# Patient Record
Sex: FEMALE | Race: WHITE
[De-identification: names, ages, dates, MRNs, and addresses within clinical notes are randomized per-mention and may not be internally consistent; named-entity substitution may affect disease eponyms.]

---

## 2018-01-08 ENCOUNTER — HOSPITAL ENCOUNTER (EMERGENCY)
Dept: HOSPITAL 80 - FED | Age: 28
Discharge: HOME | End: 2018-01-08
Payer: MEDICAID

## 2018-01-08 VITALS — RESPIRATION RATE: 16 BRPM | TEMPERATURE: 98.8 F

## 2018-01-08 VITALS — OXYGEN SATURATION: 98 % | SYSTOLIC BLOOD PRESSURE: 124 MMHG | HEART RATE: 80 BPM | DIASTOLIC BLOOD PRESSURE: 76 MMHG
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
## 2018-01-08 DIAGNOSIS — E11.9: ICD-10-CM

## 2018-01-08 DIAGNOSIS — Z79.4: ICD-10-CM

## 2018-01-08 DIAGNOSIS — S93.402A: Primary | ICD-10-CM
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
## 2018-01-08 DIAGNOSIS — Z87.891: ICD-10-CM

## 2018-01-08 PROCEDURE — L4386 NON-PNEUM WALK BOOT PRE CST: HCPCS

## 2018-01-08 NOTE — EDPHY
H & P


HPI/ROS: 





HPI





CHIEF COMPLAINT:  Left ankle pain.





HISTORY OF PRESENT ILLNESS:  This patient very pleasant 27-year-old female, she 

is otherwise healthy but does have a history of diabetes, she presents 

emergency room with left ankle pain.  She reports that she was in the kitchen 

last night rolled her left ankle.  She now has tenderness to the left lateral 

malleolus.  And some mild soft tissue swelling inferior to left lateral 

malleolus.  She has good distal pulse.  Good cap refill.  Warm extremity.  She 

denies any knee pain or upper thigh pain. She states that hurts when she bears 

weight on it.





Past Medical History:  Denies significant medical history except for diabetes





Past Surgical History:  No recent surgery





Social History:  Lives locally, denies daily use of drugs alcohol tobacco.





Family History:  Noncontributory








ROS   


REVIEW OF SYSTEMS:


A comprehensive 10 point review of systems is otherwise negative aside from 

elements mentioned in the history of present illness.








Exam   


Constitutional  appears well nontoxic triage nursing summary reviewed, vital 

signs reviewed, awake/alert. 


Eyes   normal conjunctivae and sclera, EOMI, PERRLA. 


HENT   normal inspection, atraumatic, moist mucus membranes, no epistaxis, neck 

supple/ no meningismus, no raccoon eyes. 


Respiratory   clear to auscultation bilaterally, normal breath sounds, no 

respiratory distress, no wheezing. 


Cardiovascular   rate normal, regular rhythm, no murmur, no edema, distal 

pulses normal. 


Gastrointestinal   soft, non-tender, no rebound, no guarding, normal bowel 

sounds, no distension, no pulsatile mass. 


Genitourinary   no CVA tenderness. 


Musculoskeletal  no midline vertebral tenderness, full range of motion, no calf 

swelling, no tenderness of extremities, no meningismus, good pulses, 

neurovascularly intact. Left lower extremity:  This is neurovascularly intact.  

Good distal pulse.  Good cap refill.  Tender palpation over the left lateral 

malleolus some swelling noted inferior left lateral malleolus.  Compartments 

are soft.  No calf tenderness or calf swelling. Good distal pulse.  Good cap 

refill.  Sensation intact.


Skin   pink, warm, & dry, no rash, skin atraumatic. 


Neurologic   awake, alert and oriented x 3, AAOx3, moves all 4 extremities 

equally, motor intact, sensory intact, CN II-XII intact, normal cerebellar, 

normal vision, normal speech. 


Psychiatric   normal mood/affect. 


Heme/Lymph/Immune   no lymphadenopathy.





Differential Diagnosis:  Includes but is not limited to in a particular order 

acute left ankle sprain, tendinitis, ankle fracture, avulsion fracture, soft 

tissue injury, nerve injury, plantar fasciitis.





Medical Decision Making:  Plan for this patient ibuprofen 800 mg for pain 

control, x-ray left ankle.





Re-evaluation:








X-ray of the left ankle reviewed by myself:  No evidence of acute fracture 

malalignment.





Patient placed in a walking boot for comfort, crutches as needed for support.  

Ibuprofen for pain control.





Recommend follow up with Podiatry or her primary care doctor.











0551:  X-ray of the left ankle is been reviewed.  Interpreted by myself.  I do 

not appreciate any significant malalignment or evidence of fracture.





Recommend crutches for weight-bearing, recommend walking boot for comfort.  

Additionally recommend ice and ibuprofen anti-inflammatory pain medicine.  If 

she continues to have pain I do recommend she follows up with Podiatry or her 

primary care doctor she understands.


Source: Patient





- Medical/Surgical History


Hx Asthma: No


Hx Chronic Respiratory Disease: No


Hx Diabetes: Yes


Hx Cardiac Disease: No


Hx Renal Disease: No


Hx Cirrhosis: No


Hx Alcoholism: No


Hx HIV/AIDS: No


Hx Splenectomy or Spleen Trauma: No


Other PMH: IDDM





- Social History


Smoking Status: Former smoker


Constitutional: 


 Initial Vital Signs











Temperature (C)  37.1 C   01/08/18 05:21


 


Heart Rate  89   01/08/18 05:21


 


Respiratory Rate  16   01/08/18 05:21


 


Blood Pressure  133/83 H  01/08/18 05:21


 


O2 Sat (%)  97   01/08/18 05:21








 











O2 Delivery Mode               Room Air














Allergies/Adverse Reactions: 


 





bacitracin Allergy (Verified 01/08/18 05:30)


 








Home Medications: 














 Medication  Instructions  Recorded


 


Insulin Pump Syringe, 3 ml  03/04/16


 


MULTI UGKL-ILSH-EW 0.25 MG  03/04/16


 


RX: Ibuprofen [Motrin (*)] 800 mg PO Q6-8PRN #10 tab 01/08/18


 


Xanax  01/08/18














Medical Decision Making





- Data Points


Medications Given: 


 








Discontinued Medications





Ibuprofen (Motrin)  800 mg PO EDNOW ONE


   Stop: 01/08/18 05:39


   Last Admin: 01/08/18 05:43 Dose:  800 mg








Departure





- Departure


Disposition: Home, Routine, Self-Care


Clinical Impression: 


Ankle sprain


Qualifiers:


 Encounter type: initial encounter Involved ligament of ankle: unspecified 

ligament Laterality: left Qualified Code(s): S93.402A - Sprain of unspecified 

ligament of left ankle, initial encounter





Condition: Good


Instructions:  Ankle Sprain (ED)


Additional Instructions: 


1.  I recommend that you ice her ankle.


2.  Take anti-inflammatory pain medicine like Tylenol Motrin for pain control.


3.  Follow up with her primary care doctor or podiatrist.


4.  Crutches to help support her weight.


5.  Walking boot for comfort.


Referrals: 


BERTA ARMENDARIZ [Primary Care Provider] - As per Instructions


Tosha Armijo DPM [Doctor of Podiatric Medicine] - As per Instructions


Prescriptions: 


RX: Ibuprofen [Motrin (*)] 800 mg PO Q6-8PRN #10 tab

## 2018-02-21 ENCOUNTER — HOSPITAL ENCOUNTER (OUTPATIENT)
Dept: HOSPITAL 80 - FIMAGING | Age: 28
End: 2018-02-21
Attending: FAMILY MEDICINE
Payer: MEDICAID

## 2018-02-21 DIAGNOSIS — E23.7: Primary | ICD-10-CM

## 2018-02-21 PROCEDURE — A9585 GADOBUTROL INJECTION: HCPCS

## 2018-04-06 ENCOUNTER — HOSPITAL ENCOUNTER (OUTPATIENT)
Dept: HOSPITAL 80 - FIMAGING | Age: 28
End: 2018-04-06
Attending: FAMILY MEDICINE
Payer: MEDICAID

## 2018-04-06 DIAGNOSIS — N63.20: Primary | ICD-10-CM

## 2018-04-06 DIAGNOSIS — R92.8: ICD-10-CM

## 2018-04-10 ENCOUNTER — HOSPITAL ENCOUNTER (OUTPATIENT)
Dept: HOSPITAL 80 - FIMAGING | Age: 28
End: 2018-04-10
Attending: INTERNAL MEDICINE
Payer: MEDICAID

## 2018-04-10 DIAGNOSIS — Z86.39: ICD-10-CM

## 2018-04-10 DIAGNOSIS — R94.6: Primary | ICD-10-CM

## 2018-04-10 PROCEDURE — 78012 THYROID UPTAKE MEASUREMENT: CPT

## 2018-04-10 PROCEDURE — A9516 IODINE I-123 SOD IODIDE MIC: HCPCS

## 2018-06-13 ENCOUNTER — HOSPITAL ENCOUNTER (EMERGENCY)
Dept: HOSPITAL 80 - FED | Age: 28
Discharge: HOME | End: 2018-06-13
Payer: MEDICAID

## 2018-06-13 VITALS — SYSTOLIC BLOOD PRESSURE: 130 MMHG | DIASTOLIC BLOOD PRESSURE: 87 MMHG

## 2018-06-13 DIAGNOSIS — Z79.4: ICD-10-CM

## 2018-06-13 DIAGNOSIS — Z87.891: ICD-10-CM

## 2018-06-13 DIAGNOSIS — E11.9: ICD-10-CM

## 2018-06-13 DIAGNOSIS — R00.2: Primary | ICD-10-CM

## 2018-06-13 DIAGNOSIS — F41.9: ICD-10-CM

## 2018-06-13 LAB — PLATELET # BLD: 404 10^3/UL (ref 150–400)

## 2018-06-13 NOTE — EDPHY
H & P


Stated Complaint: Elevated HR and dizziness on and off for several weeks.


Time Seen by Provider: 06/13/18 13:48


HPI/ROS: 





CHIEF COMPLAINT:  Palpitations





HISTORY OF PRESENT ILLNESS:  Patient is a 27-year-old female with a history of 

diabetes with an insulin pump also anxiety and pituitary cyst and thyroiditis.  

She reports intermittent tachycardia up to 95. She states that this is happened 

for several years but seems to happen more frequently in the last few weeks.  

She states that her typical heart rate is in the 70s.  She states that it 

occasionally happens with exertion but often times even while resting or lying 

still.  She has had echoes in the past which have been normal.  She has not 

worn a Holter monitor.  She denies chest pain.  She denies shortness of breath.

  She denies nausea vomiting or diaphoresis.  No lightheadedness.








REVIEW OF SYSTEMS:


Constitutional:  denies: chills, fever, recent illness, recent injury


EENTM: denies: blurred vision, double vision, nose congestion


Respiratory: denies: cough, shortness of breath


Cardiac:  See HPI denies: chest pain, irregular heart rate, lightheadedness, 


Gastrointestinal/Abdominal: denies: abdominal pain, diarrhea, nausea, vomiting, 

blood streaked stools


Genitourinary: denies: dysuria, frequency, hematuria, pain


Musculoskeletal: denies: joint pain, muscle pain


Skin: denies: lesions, rash, jaundice, bruising


Neurological: denies: headache, numbness, paresthesia, tingling, dizziness, 

weakness


Hematologic/Lymphatic: denies: blood clots, easy bleeding, easy bruising


Immunologic/allergic: denies: HIV/AIDS, transplant








EXAM:


GENERAL:  Well-appearing, well-nourished and in no acute distress.


HEAD:  Atraumatic, normocephalic.


EYES:  Pupils equal round and reactive to light, extraocular movements intact, 

sclera anicteric, conjunctiva are normal.


ENT:  TMs normal, nares patent, oropharynx clear without exudates.  Moist 

mucous membranes.


NECK:  Normal range of motion, supple without lymphadenopathy or JVD.


LUNGS:  Breath sounds clear to auscultation bilaterally and equal.  No wheezes 

rales or rhonchi.


HEART:  Regular rate and rhythm without murmurs, rubs or gallops.


ABDOMEN:  Soft, nontender, normoactive bowel sounds.  No guarding, no rebound.  

No masses appreciated.


BACK:  No CVA tenderness, no spinal tenderness, step-offs or deformities


EXTREMITIES:  Normal range of motion, no pitting or edema.  No clubbing or 

cyanosis.


NEUROLOGICAL:  Cranial nerves II through XII grossly intact.  Normal speech, 

normal gait.  5/5 strength, normal movement in all extremities, normal sensation


PSYCH:  Normal mood, normal affect.


SKIN:  Warm, dry, normal turgor, no visible rashes or lesions.








Source: Patient


Exam Limitations: No limitations





- Personal History


LMP (Females 10-55): 8-14 Days Ago


Current Tetanus Diphtheria and Acellular Pertussis (TDAP): Unsure





- Medical/Surgical History


Hx Asthma: No


Hx Chronic Respiratory Disease: No


Hx Diabetes: Yes


Hx Cardiac Disease: No


Hx Renal Disease: No


Hx Cirrhosis: No


Hx Alcoholism: No


Hx HIV/AIDS: No


Hx Splenectomy or Spleen Trauma: No


Other PMH: IDDM





- Family History


Significant Family History: No pertinent family hx





- Social History


Smoking Status: Former smoker


Alcohol Use: None


Constitutional: 


 Initial Vital Signs











Temperature (C)  36.3 C   06/13/18 12:27


 


Heart Rate  97   06/13/18 12:27


 


Respiratory Rate  20   06/13/18 12:27


 


Blood Pressure  143/103 H  06/13/18 12:27


 


O2 Sat (%)  99   06/13/18 12:27








 











O2 Delivery Mode               Room Air














Allergies/Adverse Reactions: 


 





bacitracin Allergy (Verified 01/08/18 05:30)


 








Home Medications: 














 Medication  Instructions  Recorded


 


Insulin Pump Syringe, 3 ml  03/04/16


 


MULTI YBIZ-TALE-LJ 0.25 MG  03/04/16


 


Ibuprofen [Motrin (*)] 800 mg PO Q6-8PRN #10 tab 01/08/18


 


Xanax  01/08/18














Medical Decision Making





- Diagnostics


EKG Interpretation: 





An EKG obtained and was read and documented in trace view.  Please see trace 

view for full reading and report.  Sinus rhythm, no acute ischemic changes 


ED Course/Re-evaluation: 





When I entered the room the patient was tearful and she states this is because 

her IV is hurting.  She asked to have it removed.  We discussed her symptoms.  

She states that her heart rate has never gone above 100 other then when she is 

exercising.  She was actually calling her primary today to make a follow-up 

appointment because she noticed her heart rate was 95 and the triage nurse 

recommended she come to the ER.  She states that she did not think it was 

really necessary to come here but they were somewhat insistent.  They told her 

she could come here to get an ultrasound.  We discussed options.  She is 

currently asymptomatic.  I told her was not overly concerned about a heart rate 

in the 90s but would recommend checking her glucose and electrolytes and 

thyroid again.  She initially thought she might leave.  She agrees to stay.





Patient's lab work is reassuring.  I recommended that she follow up for Holter 

monitoring with her primary.  She agrees with this.  She is currently 

asymptomatic.  Do not think that she would benefit from an ultrasound today.


Differential Diagnosis: 





Partial list of the Differential diagnosis considered include but were not 

limited to;  arrhythmia, palpitations, anxiety and although unlikely based on 

the history and physical exam, I also considered acute coronary disease, 

infection.  I discussed these differential diagnoses and the plan with the 

patient as well as the usual and expected course.  The patient understands that 

the diagnosis is provisional and that in medicine we are not always correct and 

that further workup is often warranted.  Usual and customary warnings were 

given.  All of the patient's questions were answered.  The patient was 

instructed to return to the emergency department should the symptoms at all 

worsen or return, otherwise to followup with the physician as we discussed.





- Data Points


Laboratory Results: 


 Laboratory Results





 06/13/18 14:00 





 06/13/18 14:00 








Point of Care Test Results: 


 Chemistry











  06/13/18





  14:04


 


POC Troponin I  0.00 ng/mL ng/mL





   (0.00-0.08) 














Departure





- Departure


Disposition: Home, Routine, Self-Care


Clinical Impression: 


 Palpitations, Anxiety about health





Condition: Fair


Instructions:  Heart Palpitations (ED)


Referrals: 


Doimnique Lam MD [Primary Care Provider] - 1-2 days without fail

## 2018-06-13 NOTE — CPEKG
Heart Rate: 78

RR Interval: 769

P-R Interval: 176

QRSD Interval: 70

QT Interval: 376

QTC Interval: 429

P Axis: 55

QRS Axis: 66

T Wave Axis: 54

EKG Severity - NORMAL ECG -

EKG Impression: SINUS RHYTHM

Electronically Signed By: Raymond Laurent 13-Jun-2018 14:37:02

## 2018-07-25 ENCOUNTER — HOSPITAL ENCOUNTER (OUTPATIENT)
Dept: HOSPITAL 80 - FIMAGING | Age: 28
End: 2018-07-25
Attending: OBSTETRICS & GYNECOLOGY
Payer: MEDICAID

## 2018-07-25 DIAGNOSIS — N63.10: Primary | ICD-10-CM

## 2019-03-21 ENCOUNTER — HOSPITAL ENCOUNTER (OUTPATIENT)
Dept: HOSPITAL 80 - FIMAGING | Age: 29
End: 2019-03-21
Attending: GENERAL ACUTE CARE HOSPITAL
Payer: MEDICAID

## 2019-03-21 DIAGNOSIS — E23.7: Primary | ICD-10-CM

## 2019-03-21 PROCEDURE — A9585 GADOBUTROL INJECTION: HCPCS
